# Patient Record
Sex: FEMALE | Race: BLACK OR AFRICAN AMERICAN
[De-identification: names, ages, dates, MRNs, and addresses within clinical notes are randomized per-mention and may not be internally consistent; named-entity substitution may affect disease eponyms.]

---

## 2017-05-01 ENCOUNTER — HOSPITAL ENCOUNTER (INPATIENT)
Dept: HOSPITAL 1 - ED | Age: 37
LOS: 2 days | Discharge: HOME | DRG: 917 | End: 2017-05-03
Attending: FAMILY MEDICINE | Admitting: FAMILY MEDICINE
Payer: COMMERCIAL

## 2017-05-01 VITALS — SYSTOLIC BLOOD PRESSURE: 110 MMHG | DIASTOLIC BLOOD PRESSURE: 87 MMHG

## 2017-05-01 VITALS — WEIGHT: 125.49 LBS | BODY MASS INDEX: 24.64 KG/M2 | HEIGHT: 60 IN

## 2017-05-01 VITALS — DIASTOLIC BLOOD PRESSURE: 76 MMHG | SYSTOLIC BLOOD PRESSURE: 113 MMHG

## 2017-05-01 VITALS — SYSTOLIC BLOOD PRESSURE: 108 MMHG | DIASTOLIC BLOOD PRESSURE: 64 MMHG

## 2017-05-01 VITALS — SYSTOLIC BLOOD PRESSURE: 123 MMHG | DIASTOLIC BLOOD PRESSURE: 88 MMHG

## 2017-05-01 DIAGNOSIS — H18.603: ICD-10-CM

## 2017-05-01 DIAGNOSIS — F33.1: ICD-10-CM

## 2017-05-01 DIAGNOSIS — J45.909: ICD-10-CM

## 2017-05-01 DIAGNOSIS — M41.9: ICD-10-CM

## 2017-05-01 DIAGNOSIS — H54.8: ICD-10-CM
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
## 2017-05-01 DIAGNOSIS — E83.42: ICD-10-CM

## 2017-05-01 DIAGNOSIS — Z91.19: ICD-10-CM

## 2017-05-01 DIAGNOSIS — M06.9: ICD-10-CM

## 2017-05-01 DIAGNOSIS — T42.8X1A: Primary | ICD-10-CM
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
## 2017-05-01 DIAGNOSIS — G92: ICD-10-CM

## 2017-05-01 LAB
ALBUMIN SERPL-MCNC: 3.7 G/DL (ref 3.4–5)
ALP SERPL-CCNC: 45 U/L (ref 46–116)
ALT SERPL-CCNC: 38 U/L (ref 14–59)
AMPHETAMINES UR QL SCN: (no result)
AMYLASE SERPL-CCNC: 96 U/L (ref 25–115)
AST SERPL-CCNC: 28 U/L (ref 15–37)
BASOPHILS NFR BLD: 0.1 % (ref 0–2)
BILIRUB SERPL-MCNC: 0.22 MG/DL (ref 0.2–1)
BUN SERPL-MCNC: 15 MG/DL (ref 7–18)
CALCIUM SERPL-MCNC: 8.3 MG/DL (ref 8.5–10.1)
CHLORIDE SERPL-SCNC: 107 MMOL/L (ref 98–107)
CHOLEST SERPL-MCNC: 133 MG/DL (ref ?–200)
CO2 SERPL-SCNC: 28.2 MMOL/L (ref 21–32)
CREAT SERPL-MCNC: 0.8 MG/DL (ref 0.6–1)
ERYTHROCYTE [DISTWIDTH] IN BLOOD BY AUTOMATED COUNT: 13.4 % (ref 11.5–14.5)
GFR SERPLBLD BASED ON 1.73 SQ M-ARVRAT: > 60 ML/MIN
GLUCOSE SERPL-MCNC: 76 MG/DL (ref 74–106)
HDLC SERPL-MCNC: 64 MG/DL (ref 40–60)
LIPASE SERPL-CCNC: 276 IU/L (ref 73–393)
MAGNESIUM SERPL-MCNC: 1.7 MG/DL (ref 1.8–2.4)
MICROSCOPIC UR-IMP: NO
PLATELET # BLD: 322 X10^3MCL (ref 130–400)
POTASSIUM SERPL-SCNC: 3.5 MMOL/L (ref 3.5–5.1)
PROT SERPL-MCNC: 6.8 G/DL (ref 6.4–8.2)
RBC # UR STRIP.AUTO: NEGATIVE /UL
SODIUM SERPL-SCNC: 142 MMOL/L (ref 136–145)
T4 SERPL-MCNC: 8.6 UG/DL (ref 4.7–13.3)
UA SPECIFIC GRAVITY: 1.02 (ref 1–1.03)

## 2017-05-01 PROCEDURE — G0480 DRUG TEST DEF 1-7 CLASSES: HCPCS

## 2017-05-02 VITALS — SYSTOLIC BLOOD PRESSURE: 121 MMHG | DIASTOLIC BLOOD PRESSURE: 88 MMHG

## 2017-05-02 VITALS — SYSTOLIC BLOOD PRESSURE: 122 MMHG | DIASTOLIC BLOOD PRESSURE: 94 MMHG

## 2017-05-02 VITALS — SYSTOLIC BLOOD PRESSURE: 107 MMHG | DIASTOLIC BLOOD PRESSURE: 89 MMHG

## 2017-05-02 VITALS — SYSTOLIC BLOOD PRESSURE: 143 MMHG | DIASTOLIC BLOOD PRESSURE: 71 MMHG

## 2017-05-02 LAB
BASOPHILS NFR BLD: 0.4 % (ref 0–2)
BUN SERPL-MCNC: 7 MG/DL (ref 7–18)
CALCIUM SERPL-MCNC: 7.7 MG/DL (ref 8.5–10.1)
CHLORIDE SERPL-SCNC: 111 MMOL/L (ref 98–107)
CO2 SERPL-SCNC: 26.1 MMOL/L (ref 21–32)
CREAT SERPL-MCNC: 0.6 MG/DL (ref 0.6–1)
ERYTHROCYTE [DISTWIDTH] IN BLOOD BY AUTOMATED COUNT: 13.5 % (ref 11.5–14.5)
GFR SERPLBLD BASED ON 1.73 SQ M-ARVRAT: > 60 ML/MIN
GLUCOSE SERPL-MCNC: 85 MG/DL (ref 74–106)
MAGNESIUM SERPL-MCNC: 2 MG/DL (ref 1.8–2.4)
PHOSPHATE SERPL-MCNC: 3.5 MG/DL (ref 2.5–4.9)
PLATELET # BLD: 262 X10^3MCL (ref 130–400)
POTASSIUM SERPL-SCNC: 3.9 MMOL/L (ref 3.5–5.1)
SODIUM SERPL-SCNC: 144 MMOL/L (ref 136–145)

## 2017-05-03 VITALS — SYSTOLIC BLOOD PRESSURE: 126 MMHG | DIASTOLIC BLOOD PRESSURE: 92 MMHG

## 2017-05-03 VITALS — SYSTOLIC BLOOD PRESSURE: 133 MMHG | DIASTOLIC BLOOD PRESSURE: 87 MMHG

## 2017-05-03 VITALS — DIASTOLIC BLOOD PRESSURE: 92 MMHG | SYSTOLIC BLOOD PRESSURE: 126 MMHG

## 2017-05-03 LAB
BASOPHILS NFR BLD: 0.4 % (ref 0–2)
BUN SERPL-MCNC: 7 MG/DL (ref 7–18)
CALCIUM SERPL-MCNC: 8.2 MG/DL (ref 8.5–10.1)
CHLORIDE SERPL-SCNC: 109 MMOL/L (ref 98–107)
CO2 SERPL-SCNC: 26.5 MMOL/L (ref 21–32)
CREAT SERPL-MCNC: 0.6 MG/DL (ref 0.6–1)
ERYTHROCYTE [DISTWIDTH] IN BLOOD BY AUTOMATED COUNT: 13.3 % (ref 11.5–14.5)
GFR SERPLBLD BASED ON 1.73 SQ M-ARVRAT: > 60 ML/MIN
GLUCOSE SERPL-MCNC: 82 MG/DL (ref 74–106)
PLATELET # BLD: 283 X10^3MCL (ref 130–400)
POTASSIUM SERPL-SCNC: 3.7 MMOL/L (ref 3.5–5.1)
SODIUM SERPL-SCNC: 143 MMOL/L (ref 136–145)

## 2018-02-27 ENCOUNTER — HOSPITAL ENCOUNTER (EMERGENCY)
Dept: HOSPITAL 1 - ED | Age: 38
Discharge: HOME | End: 2018-02-27
Payer: COMMERCIAL

## 2018-02-27 VITALS — SYSTOLIC BLOOD PRESSURE: 120 MMHG | DIASTOLIC BLOOD PRESSURE: 73 MMHG

## 2018-02-27 VITALS — BODY MASS INDEX: 26.61 KG/M2 | HEIGHT: 59.02 IN | WEIGHT: 131.99 LBS

## 2018-02-27 DIAGNOSIS — B34.9: Primary | ICD-10-CM

## 2018-03-04 ENCOUNTER — HOSPITAL ENCOUNTER (EMERGENCY)
Dept: HOSPITAL 1 - ED | Age: 38
Discharge: HOME | End: 2018-03-04
Payer: COMMERCIAL

## 2018-03-04 VITALS — HEIGHT: 59.02 IN | WEIGHT: 134 LBS | BODY MASS INDEX: 27.01 KG/M2

## 2018-03-04 VITALS — SYSTOLIC BLOOD PRESSURE: 132 MMHG | DIASTOLIC BLOOD PRESSURE: 60 MMHG

## 2018-03-04 DIAGNOSIS — Z88.1: ICD-10-CM

## 2018-03-04 DIAGNOSIS — M54.5: Primary | ICD-10-CM

## 2018-03-04 DIAGNOSIS — Z88.6: ICD-10-CM

## 2018-04-27 ENCOUNTER — HOSPITAL ENCOUNTER (EMERGENCY)
Dept: HOSPITAL 1 - ED | Age: 38
Discharge: HOME | End: 2018-04-27
Payer: COMMERCIAL

## 2018-04-27 VITALS — BODY MASS INDEX: 25.2 KG/M2 | WEIGHT: 124.98 LBS | HEIGHT: 59.02 IN

## 2018-04-27 VITALS — SYSTOLIC BLOOD PRESSURE: 121 MMHG | DIASTOLIC BLOOD PRESSURE: 86 MMHG

## 2018-04-27 DIAGNOSIS — Z88.5: ICD-10-CM

## 2018-04-27 DIAGNOSIS — Z88.8: ICD-10-CM

## 2018-04-27 DIAGNOSIS — J20.8: Primary | ICD-10-CM

## 2018-04-27 DIAGNOSIS — J45.909: ICD-10-CM

## 2018-08-21 ENCOUNTER — HOSPITAL ENCOUNTER (EMERGENCY)
Dept: HOSPITAL 1 - ED | Age: 38
Discharge: HOME | End: 2018-08-21
Payer: COMMERCIAL

## 2018-08-21 VITALS — DIASTOLIC BLOOD PRESSURE: 84 MMHG | SYSTOLIC BLOOD PRESSURE: 122 MMHG

## 2018-08-21 VITALS — WEIGHT: 116 LBS | HEIGHT: 59 IN | BODY MASS INDEX: 23.39 KG/M2

## 2018-08-21 DIAGNOSIS — M25.551: ICD-10-CM

## 2018-08-21 DIAGNOSIS — Z88.6: ICD-10-CM

## 2018-08-21 DIAGNOSIS — J45.909: ICD-10-CM

## 2018-08-21 DIAGNOSIS — Z88.1: ICD-10-CM

## 2018-08-21 DIAGNOSIS — M54.5: Primary | ICD-10-CM

## 2018-10-15 ENCOUNTER — HOSPITAL ENCOUNTER (EMERGENCY)
Dept: HOSPITAL 1 - ED | Age: 38
Discharge: HOME | End: 2018-10-15
Payer: COMMERCIAL

## 2018-10-15 VITALS — BODY MASS INDEX: 23.18 KG/M2 | WEIGHT: 115 LBS | HEIGHT: 59 IN

## 2018-10-15 VITALS — DIASTOLIC BLOOD PRESSURE: 95 MMHG | SYSTOLIC BLOOD PRESSURE: 121 MMHG

## 2018-10-15 DIAGNOSIS — M41.9: Primary | ICD-10-CM

## 2018-10-15 DIAGNOSIS — Z88.6: ICD-10-CM

## 2018-10-15 DIAGNOSIS — Z88.1: ICD-10-CM

## 2018-10-15 DIAGNOSIS — M25.512: ICD-10-CM

## 2018-10-15 DIAGNOSIS — Z90.710: ICD-10-CM

## 2018-10-15 DIAGNOSIS — J45.909: ICD-10-CM

## 2018-10-30 ENCOUNTER — HOSPITAL ENCOUNTER (EMERGENCY)
Dept: HOSPITAL 1 - ED | Age: 38
Discharge: HOME | End: 2018-10-30
Payer: COMMERCIAL

## 2018-10-30 VITALS
BODY MASS INDEX: 21.77 KG/M2 | WEIGHT: 108 LBS | BODY MASS INDEX: 21.77 KG/M2 | HEIGHT: 59 IN | WEIGHT: 108 LBS | HEIGHT: 59 IN

## 2018-10-30 VITALS — SYSTOLIC BLOOD PRESSURE: 124 MMHG | DIASTOLIC BLOOD PRESSURE: 106 MMHG

## 2018-10-30 DIAGNOSIS — Z88.1: ICD-10-CM

## 2018-10-30 DIAGNOSIS — M54.5: Primary | ICD-10-CM

## 2018-10-30 DIAGNOSIS — Z88.6: ICD-10-CM

## 2018-10-30 DIAGNOSIS — Z94.7: ICD-10-CM

## 2018-10-30 DIAGNOSIS — M41.9: ICD-10-CM

## 2018-10-30 DIAGNOSIS — J45.909: ICD-10-CM

## 2018-10-30 DIAGNOSIS — Z90.711: ICD-10-CM

## 2018-11-27 ENCOUNTER — HOSPITAL ENCOUNTER (INPATIENT)
Dept: HOSPITAL 1 - ED | Age: 38
LOS: 2 days | Discharge: HOME | DRG: 917 | End: 2018-11-29
Attending: FAMILY MEDICINE | Admitting: FAMILY MEDICINE
Payer: COMMERCIAL

## 2018-11-27 VITALS
BODY MASS INDEX: 22.87 KG/M2 | WEIGHT: 113.44 LBS | WEIGHT: 113.44 LBS | HEIGHT: 59 IN | BODY MASS INDEX: 22.87 KG/M2 | HEIGHT: 59 IN

## 2018-11-27 VITALS — DIASTOLIC BLOOD PRESSURE: 103 MMHG | SYSTOLIC BLOOD PRESSURE: 139 MMHG

## 2018-11-27 DIAGNOSIS — E87.6: ICD-10-CM

## 2018-11-27 DIAGNOSIS — J45.909: ICD-10-CM
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
## 2018-11-27 DIAGNOSIS — E83.42: ICD-10-CM

## 2018-11-27 DIAGNOSIS — F16.10: ICD-10-CM

## 2018-11-27 DIAGNOSIS — E83.39: ICD-10-CM

## 2018-11-27 DIAGNOSIS — N17.0: ICD-10-CM

## 2018-11-27 DIAGNOSIS — M06.9: ICD-10-CM

## 2018-11-27 DIAGNOSIS — Z87.891: ICD-10-CM

## 2018-11-27 DIAGNOSIS — F11.10: ICD-10-CM

## 2018-11-27 DIAGNOSIS — M41.9: ICD-10-CM

## 2018-11-27 DIAGNOSIS — R10.31: ICD-10-CM

## 2018-11-27 DIAGNOSIS — D53.9: ICD-10-CM

## 2018-11-27 DIAGNOSIS — R00.0: ICD-10-CM

## 2018-11-27 DIAGNOSIS — T43.621A: Primary | ICD-10-CM

## 2018-11-27 LAB
ALBUMIN SERPL-MCNC: 3.4 G/DL (ref 3.4–5)
ALP SERPL-CCNC: 44 U/L (ref 46–116)
ALT SERPL-CCNC: 15 U/L (ref 14–59)
AMPHETAMINES UR QL SCN: POSITIVE
AMYLASE SERPL-CCNC: 40 U/L (ref 25–115)
AST SERPL-CCNC: 22 U/L (ref 15–37)
BASOPHILS NFR BLD: 0.2 % (ref 0–2)
BILIRUB SERPL-MCNC: 0.2 MG/DL (ref 0.2–1)
BUN SERPL-MCNC: 19 MG/DL (ref 7–18)
CALCIUM SERPL-MCNC: 7.7 MG/DL (ref 8.5–10.1)
CHLORIDE SERPL-SCNC: 108 MMOL/L (ref 98–107)
CHOLEST SERPL-MCNC: 119 MG/DL (ref ?–200)
CHOLEST/HDLC SERPL: 2.8 MG/DL
CO2 SERPL-SCNC: 18.3 MMOL/L (ref 21–32)
CREAT SERPL-MCNC: 1 MG/DL (ref 0.6–1)
ERYTHROCYTE [DISTWIDTH] IN BLOOD BY AUTOMATED COUNT: 13.1 % (ref 11.5–14.5)
GFR SERPLBLD BASED ON 1.73 SQ M-ARVRAT: > 60 ML/MIN
GLUCOSE SERPL-MCNC: 183 MG/DL (ref 74–106)
HDLC SERPL-MCNC: 42 MG/DL (ref 40–60)
LIPASE SERPL-CCNC: 143 IU/L (ref 73–393)
MAGNESIUM SERPL-MCNC: 1.6 MG/DL (ref 1.8–2.4)
PHOSPHATE SERPL-MCNC: 1 MG/DL (ref 2.5–4.9)
PLATELET # BLD: 314 X10^3MCL (ref 130–400)
POTASSIUM SERPL-SCNC: 3 MMOL/L (ref 3.5–5.1)
PROT SERPL-MCNC: 6.6 G/DL (ref 6.4–8.2)
SODIUM SERPL-SCNC: 139 MMOL/L (ref 136–145)
T3 SERPL-MCNC: 1.33 NG/ML
T3RU NFR SERPL: 38 % UPTAKE (ref 30–39)
T4 FREE SERPL-MCNC: 1.51 NG/DL (ref 0.76–1.46)
T4 SERPL-MCNC: 9.2 UG/DL (ref 4.7–13.3)
T4/T3 UPTAKE INDEX SERPL: 3.5 UG/DL (ref 1.4–4.5)
TRIGL SERPL-MCNC: 34 MG/DL (ref ?–150)

## 2018-11-27 PROCEDURE — G0480 DRUG TEST DEF 1-7 CLASSES: HCPCS

## 2018-11-28 VITALS — SYSTOLIC BLOOD PRESSURE: 139 MMHG | DIASTOLIC BLOOD PRESSURE: 103 MMHG

## 2018-11-28 VITALS — SYSTOLIC BLOOD PRESSURE: 96 MMHG | DIASTOLIC BLOOD PRESSURE: 69 MMHG

## 2018-11-28 VITALS — SYSTOLIC BLOOD PRESSURE: 108 MMHG | DIASTOLIC BLOOD PRESSURE: 65 MMHG

## 2018-11-28 VITALS — SYSTOLIC BLOOD PRESSURE: 113 MMHG | DIASTOLIC BLOOD PRESSURE: 77 MMHG

## 2018-11-28 VITALS — DIASTOLIC BLOOD PRESSURE: 66 MMHG | SYSTOLIC BLOOD PRESSURE: 105 MMHG

## 2018-11-28 VITALS — SYSTOLIC BLOOD PRESSURE: 101 MMHG | DIASTOLIC BLOOD PRESSURE: 69 MMHG

## 2018-11-28 VITALS — SYSTOLIC BLOOD PRESSURE: 106 MMHG | DIASTOLIC BLOOD PRESSURE: 77 MMHG

## 2018-11-28 LAB
BASOPHILS NFR BLD: 0.3 % (ref 0–2)
BUN SERPL-MCNC: 9 MG/DL (ref 7–18)
CALCIUM SERPL-MCNC: 8.7 MG/DL (ref 8.5–10.1)
CHLORIDE SERPL-SCNC: 108 MMOL/L (ref 98–107)
CO2 SERPL-SCNC: 23.5 MMOL/L (ref 21–32)
CREAT SERPL-MCNC: 0.7 MG/DL (ref 0.6–1)
ERYTHROCYTE [DISTWIDTH] IN BLOOD BY AUTOMATED COUNT: 12.9 % (ref 11.5–14.5)
GFR SERPLBLD BASED ON 1.73 SQ M-ARVRAT: > 60 ML/MIN
GLUCOSE SERPL-MCNC: 80 MG/DL (ref 74–106)
MICROSCOPIC UR-IMP: YES
PLATELET # BLD: 294 X10^3MCL (ref 130–400)
POTASSIUM SERPL-SCNC: 3.6 MMOL/L (ref 3.5–5.1)
RBC # UR STRIP.AUTO: NEGATIVE /UL
SODIUM SERPL-SCNC: 141 MMOL/L (ref 136–145)
UA SPECIFIC GRAVITY: 1.03 (ref 1–1.03)

## 2018-11-29 VITALS — DIASTOLIC BLOOD PRESSURE: 77 MMHG | SYSTOLIC BLOOD PRESSURE: 117 MMHG

## 2018-11-29 VITALS — DIASTOLIC BLOOD PRESSURE: 82 MMHG | SYSTOLIC BLOOD PRESSURE: 120 MMHG

## 2018-11-29 VITALS — DIASTOLIC BLOOD PRESSURE: 89 MMHG | SYSTOLIC BLOOD PRESSURE: 129 MMHG

## 2019-02-01 ENCOUNTER — HOSPITAL ENCOUNTER (EMERGENCY)
Dept: HOSPITAL 1 - ED | Age: 39
Discharge: HOME | End: 2019-02-01
Payer: COMMERCIAL

## 2019-02-01 VITALS — HEIGHT: 59 IN | BODY MASS INDEX: 23.59 KG/M2 | WEIGHT: 117 LBS

## 2019-02-01 VITALS — SYSTOLIC BLOOD PRESSURE: 128 MMHG | DIASTOLIC BLOOD PRESSURE: 83 MMHG

## 2019-02-01 DIAGNOSIS — Z90.711: ICD-10-CM

## 2019-02-01 DIAGNOSIS — E87.6: Primary | ICD-10-CM

## 2019-02-01 DIAGNOSIS — R53.1: ICD-10-CM

## 2019-02-01 DIAGNOSIS — J45.909: ICD-10-CM

## 2019-02-01 DIAGNOSIS — Z88.6: ICD-10-CM

## 2019-02-01 DIAGNOSIS — R42: ICD-10-CM

## 2019-02-01 DIAGNOSIS — Z88.1: ICD-10-CM

## 2019-02-01 LAB
ALBUMIN SERPL-MCNC: 4.2 G/DL (ref 3.4–5)
ALP SERPL-CCNC: 51 U/L (ref 46–116)
ALT SERPL-CCNC: 35 U/L (ref 14–59)
AMPHETAMINES UR QL SCN: (no result)
AST SERPL-CCNC: 36 U/L (ref 15–37)
BASOPHILS NFR BLD: 0.4 % (ref 0–2)
BILIRUB SERPL-MCNC: 0.3 MG/DL (ref 0.2–1)
BUN SERPL-MCNC: 16 MG/DL (ref 7–18)
CALCIUM SERPL-MCNC: 9.2 MG/DL (ref 8.5–10.1)
CHLORIDE SERPL-SCNC: 101 MMOL/L (ref 98–107)
CO2 SERPL-SCNC: 23.6 MMOL/L (ref 21–32)
CREAT SERPL-MCNC: 0.9 MG/DL (ref 0.6–1)
ERYTHROCYTE [DISTWIDTH] IN BLOOD BY AUTOMATED COUNT: 11.6 % (ref 11.5–14.5)
GFR SERPLBLD BASED ON 1.73 SQ M-ARVRAT: > 60 ML/MIN
GLUCOSE SERPL-MCNC: 107 MG/DL (ref 74–106)
PLATELET # BLD: 331 X10^3MCL (ref 130–400)
POTASSIUM SERPL-SCNC: 3.2 MMOL/L (ref 3.5–5.1)
PROT SERPL-MCNC: 8.3 G/DL (ref 6.4–8.2)
SODIUM SERPL-SCNC: 136 MMOL/L (ref 136–145)
T3 SERPL-MCNC: 1.81 NG/ML
T3RU NFR SERPL: 37 % UPTAKE (ref 30–39)
T4 FREE SERPL-MCNC: 1.17 NG/DL (ref 0.76–1.46)
T4 SERPL-MCNC: 11.7 UG/DL (ref 4.7–13.3)
T4/T3 UPTAKE INDEX SERPL: 4.3 UG/DL (ref 1.4–4.5)

## 2019-02-23 ENCOUNTER — HOSPITAL ENCOUNTER (EMERGENCY)
Dept: HOSPITAL 1 - ED | Age: 39
Discharge: HOME | End: 2019-02-23
Payer: COMMERCIAL

## 2019-02-23 VITALS — DIASTOLIC BLOOD PRESSURE: 78 MMHG | SYSTOLIC BLOOD PRESSURE: 102 MMHG

## 2019-02-23 VITALS — WEIGHT: 112 LBS | HEIGHT: 59 IN | BODY MASS INDEX: 22.58 KG/M2

## 2019-02-23 DIAGNOSIS — I10: ICD-10-CM
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
## 2019-02-23 DIAGNOSIS — M54.5: Primary | ICD-10-CM

## 2019-02-23 DIAGNOSIS — H18.609: ICD-10-CM

## 2019-02-23 LAB
MICROSCOPIC UR-IMP: NO
RBC # UR STRIP.AUTO: NEGATIVE /UL
UA SPECIFIC GRAVITY: >=1.03 (ref 1–1.03)

## 2019-12-21 ENCOUNTER — HOSPITAL ENCOUNTER (EMERGENCY)
Dept: HOSPITAL 26 - MED | Age: 39
Discharge: HOME | End: 2019-12-21
Payer: COMMERCIAL

## 2019-12-21 VITALS — DIASTOLIC BLOOD PRESSURE: 90 MMHG | SYSTOLIC BLOOD PRESSURE: 138 MMHG

## 2019-12-21 VITALS — HEIGHT: 60 IN | BODY MASS INDEX: 23.16 KG/M2 | WEIGHT: 118 LBS

## 2019-12-21 VITALS — SYSTOLIC BLOOD PRESSURE: 138 MMHG | DIASTOLIC BLOOD PRESSURE: 90 MMHG

## 2019-12-21 DIAGNOSIS — J45.909: ICD-10-CM

## 2019-12-21 DIAGNOSIS — Z85.89: ICD-10-CM

## 2019-12-21 DIAGNOSIS — M79.652: Primary | ICD-10-CM

## 2019-12-21 DIAGNOSIS — Z88.8: ICD-10-CM

## 2019-12-21 DIAGNOSIS — Z90.710: ICD-10-CM

## 2019-12-21 DIAGNOSIS — Z88.1: ICD-10-CM

## 2019-12-21 PROCEDURE — 99283 EMERGENCY DEPT VISIT LOW MDM: CPT

## 2019-12-21 PROCEDURE — 96372 THER/PROPH/DIAG INJ SC/IM: CPT

## 2019-12-21 PROCEDURE — 81002 URINALYSIS NONAUTO W/O SCOPE: CPT

## 2019-12-21 NOTE — NUR
REPORTS PAIN RELIEF; 6/10. PT STATES PAIN LEVEL IS TOLERABLE AT THIS TIME. PT 
STATES SHE WILL BE CALLING MOM FOR RIDE HOME.

## 2019-12-21 NOTE — NUR
Patient discharged with v/s stable. Written and verbal after care instructions 
given and explained. 

Patient alert, oriented and verbalized understanding of instructions. 
Ambulatory with steady gait. All questions addressed prior to discharge. ID 
band removed. Patient advised to follow up with PMD. Rx of Norco given. Patient 
educated on indication of medication including possible reaction and side 
effects. Opportunity to ask questions provided and answered.

## 2019-12-21 NOTE — NUR
40 YO F NURIA SELF PRESENTS TO ED C/O 10/10 THROBBING UPPER LEFT LEG PAIN X 3 
DAYS THAT IS CONSTANT. PT HAS HAD SIMILAR PAIN BEFORE X 1 YEAR AGO. NEGATIVE 
XRAY, TX'D WITH PHYSICAL THERAPY. PT STATES PHYSICAL THERAPY MADE IT WORSE BUT 
GRADUALLY WENT AWAY. PT STATES PAIN HAS BEEN GRADUALLY RETURNING X 1 WEEK. PAIN 
HAS BEEN UNCONTROLLED AND SEVERE X 3 DAYS. MEDICATED AT HOME WITH TRAMADOL AND 
ROBAXIN WITH MINIMAL RELIEF. REPORTS DIFFICULTY WALKING.



PMH-- HIV+, HYSTERECTOMY- 2014, HERNIA REPAIR-2015

RX-- TRIMEC, TRAMADOL AND ROBAXIN 

-------------------------------------------------------------------------------

Addendum: 12/21/19 at 0612 by [facility]

-------------------------------------------------------------------------------

DENIES RECENT TRAUMA/INJURY.

## 2019-12-29 ENCOUNTER — HOSPITAL ENCOUNTER (EMERGENCY)
Dept: HOSPITAL 26 - MED | Age: 39
Discharge: HOME | End: 2019-12-29
Payer: COMMERCIAL

## 2019-12-29 VITALS — BODY MASS INDEX: 23.79 KG/M2 | HEIGHT: 59 IN | WEIGHT: 118 LBS

## 2019-12-29 VITALS — DIASTOLIC BLOOD PRESSURE: 78 MMHG | SYSTOLIC BLOOD PRESSURE: 121 MMHG

## 2019-12-29 VITALS — DIASTOLIC BLOOD PRESSURE: 79 MMHG | SYSTOLIC BLOOD PRESSURE: 127 MMHG

## 2019-12-29 DIAGNOSIS — Z98.890: ICD-10-CM

## 2019-12-29 DIAGNOSIS — Z79.899: ICD-10-CM

## 2019-12-29 DIAGNOSIS — Z88.8: ICD-10-CM

## 2019-12-29 DIAGNOSIS — J45.909: ICD-10-CM

## 2019-12-29 DIAGNOSIS — Z88.1: ICD-10-CM

## 2019-12-29 DIAGNOSIS — Z76.0: ICD-10-CM

## 2019-12-29 DIAGNOSIS — M79.605: Primary | ICD-10-CM

## 2019-12-29 DIAGNOSIS — Z90.710: ICD-10-CM

## 2019-12-29 PROCEDURE — 99283 EMERGENCY DEPT VISIT LOW MDM: CPT

## 2019-12-29 PROCEDURE — 96372 THER/PROPH/DIAG INJ SC/IM: CPT

## 2019-12-29 NOTE — NUR
Patient discharged with v/s stable. Written and verbal after care instructions 
given and explained. 

Patient alert, oriented and verbalized understanding of instructions. 
Ambulatory with steady gait. All questions addressed prior to discharge. ID 
band removed. Patient advised to follow up with PMD. Rx of VOLTAREN given. 
Patient educated on indication of medication including possible reaction and 
side effects. Opportunity to ask questions provided and answered.

## 2019-12-29 NOTE — NUR
C/O L LEG PAIN 8/10 & THROBBING X2 WEEKS. PT STATES THE PAIN STARTS IN THE HIP 
AREA AND RADIATES DOWN THE LLE. MEDICATED AT HOME WITH TRAMADOL WITH NO RELIEF. 
PT STATES IT IS PAINFUL AND DIFFICULT TO WALK. NO OBVIOUS DEFORMITY TO LLE, PT 
DENIES INJURY. CMS INTACT. PT PROVIDED GOWN TO CHANGE INTO, SIDE RAIL UP X1, 
BED IN LOW POSITION.

## 2020-01-27 ENCOUNTER — HOSPITAL ENCOUNTER (EMERGENCY)
Dept: HOSPITAL 26 - MED | Age: 40
Discharge: HOME | End: 2020-01-27
Payer: COMMERCIAL

## 2020-01-27 VITALS — HEIGHT: 60 IN | BODY MASS INDEX: 23.16 KG/M2 | WEIGHT: 118 LBS

## 2020-01-27 VITALS — SYSTOLIC BLOOD PRESSURE: 113 MMHG | DIASTOLIC BLOOD PRESSURE: 93 MMHG

## 2020-01-27 VITALS — DIASTOLIC BLOOD PRESSURE: 89 MMHG | SYSTOLIC BLOOD PRESSURE: 121 MMHG

## 2020-01-27 DIAGNOSIS — M25.552: ICD-10-CM

## 2020-01-27 DIAGNOSIS — Z76.0: ICD-10-CM

## 2020-01-27 DIAGNOSIS — J45.909: ICD-10-CM

## 2020-01-27 DIAGNOSIS — Z85.89: ICD-10-CM

## 2020-01-27 DIAGNOSIS — G89.29: Primary | ICD-10-CM

## 2020-01-27 DIAGNOSIS — Z79.899: ICD-10-CM

## 2020-01-27 DIAGNOSIS — Z88.1: ICD-10-CM

## 2020-03-15 ENCOUNTER — HOSPITAL ENCOUNTER (EMERGENCY)
Dept: HOSPITAL 26 - MED | Age: 40
Discharge: HOME | End: 2020-03-15
Payer: COMMERCIAL

## 2020-03-15 VITALS — DIASTOLIC BLOOD PRESSURE: 69 MMHG | SYSTOLIC BLOOD PRESSURE: 112 MMHG

## 2020-03-15 VITALS — SYSTOLIC BLOOD PRESSURE: 112 MMHG | DIASTOLIC BLOOD PRESSURE: 69 MMHG

## 2020-03-15 VITALS — WEIGHT: 115 LBS | BODY MASS INDEX: 22.58 KG/M2 | HEIGHT: 60 IN

## 2020-03-15 DIAGNOSIS — Z79.899: ICD-10-CM

## 2020-03-15 DIAGNOSIS — J45.909: ICD-10-CM

## 2020-03-15 DIAGNOSIS — I10: ICD-10-CM

## 2020-03-15 DIAGNOSIS — M25.552: Primary | ICD-10-CM

## 2020-03-15 DIAGNOSIS — Z88.1: ICD-10-CM

## 2020-03-15 DIAGNOSIS — Z88.6: ICD-10-CM

## 2020-03-15 DIAGNOSIS — Z85.3: ICD-10-CM

## 2020-03-15 DIAGNOSIS — M79.605: ICD-10-CM

## 2020-03-15 PROCEDURE — 99284 EMERGENCY DEPT VISIT MOD MDM: CPT

## 2020-03-15 PROCEDURE — 96372 THER/PROPH/DIAG INJ SC/IM: CPT

## 2020-03-15 NOTE — NUR
40 Y/O FEMALE C/O LT HIP PAIN X 1 YEAR WORSENING THE LAST 2 DAYS. STATES SHE IS 
AWAITING HIP REPLACEMENT AND PAIN MEDICATIONS ARE NOT CONTROLLING PAIN. STATES 
SHE IS UNABLE TO AMBULATE, DENIES TRAUMA/INJURY. 10/10 CONSTANT THROBBING PAIN. 
+ CMS. SKIN WARM AND DRY. NO DEFORMITIES NOTED. NO BRUISING/SWELLING. SITTING 
IN BED X 1 SIDE RAIL RAISED, BED LOCKED AND IN LOW POSITION. 



MEDHX: HIV, HTN

ALLERGIES: ROCEPHIN, IBUPROFEN

## 2020-03-15 NOTE — NUR
Patient discharged with v/s stable. Written and verbal after care instructions 
given and explained. 

Patient alert, oriented and verbalized understanding of instructions. 
Ambulatory with steady gait. All questions addressed prior to discharge. ID 
band removed. Patient advised to follow up with PMD. Rx of NORCO given. Patient 
educated on indication of medication including possible reaction and side 
effects. Opportunity to ask questions provided and answered.

PT STATES SHE HAS RIDE HOME

## 2020-09-08 ENCOUNTER — HOSPITAL ENCOUNTER (EMERGENCY)
Dept: HOSPITAL 26 - MED | Age: 40
Discharge: LEFT BEFORE DISCHARGE | End: 2020-09-08
Payer: COMMERCIAL

## 2020-09-08 VITALS — WEIGHT: 107.25 LBS | BODY MASS INDEX: 21.62 KG/M2 | HEIGHT: 59 IN

## 2020-09-08 VITALS — SYSTOLIC BLOOD PRESSURE: 132 MMHG | DIASTOLIC BLOOD PRESSURE: 92 MMHG

## 2020-09-08 DIAGNOSIS — Z79.899: ICD-10-CM

## 2020-09-08 DIAGNOSIS — J45.909: ICD-10-CM

## 2020-09-08 DIAGNOSIS — Z88.1: ICD-10-CM

## 2020-09-08 DIAGNOSIS — G89.29: ICD-10-CM

## 2020-09-08 DIAGNOSIS — Z85.41: ICD-10-CM

## 2020-09-08 DIAGNOSIS — I10: ICD-10-CM

## 2020-09-08 DIAGNOSIS — Z88.8: ICD-10-CM

## 2020-09-08 DIAGNOSIS — M25.552: Primary | ICD-10-CM

## 2020-09-08 PROCEDURE — 99283 EMERGENCY DEPT VISIT LOW MDM: CPT

## 2020-09-08 PROCEDURE — 96372 THER/PROPH/DIAG INJ SC/IM: CPT

## 2020-09-08 NOTE — NUR
41 YO FEMALE CO LEFT HIP PAIN SINCE YESTERDAY. PT STATES THAT SHE STEPPED OUT 
OF THE SHOWER WRONG AND HURT HER HIP. PT IS SEEING A SPECIALIST TO HAVE A HIP 
REPLACEMENT. PAIN IS 10/10 AND ACHING. PT IS ABLE TO AMBULATE. 



PMH- HTN, HIV, CERVICAL CANCER, ASTHMA

MEDS- NORCO Problem: Patient Care Overview  Goal: Plan of Care Review  Outcome: Ongoing (interventions implemented as appropriate)   03/15/18 0908   Coping/Psychosocial   Plan of Care Reviewed With patient   OTHER   Outcome Summary Pt increased ambulation distance. Heel strike better bilaterally w/ increased distance. More impulsive today w/ bed mobility and transfers.

## 2021-08-01 ENCOUNTER — HOSPITAL ENCOUNTER (EMERGENCY)
Dept: HOSPITAL 26 - MED | Age: 41
LOS: 1 days | Discharge: HOME | End: 2021-08-02
Payer: COMMERCIAL

## 2021-08-01 VITALS — DIASTOLIC BLOOD PRESSURE: 106 MMHG | SYSTOLIC BLOOD PRESSURE: 148 MMHG

## 2021-08-01 VITALS — WEIGHT: 110 LBS | HEIGHT: 59 IN | BODY MASS INDEX: 22.18 KG/M2

## 2021-08-01 DIAGNOSIS — J45.909: ICD-10-CM

## 2021-08-01 DIAGNOSIS — R00.0: ICD-10-CM

## 2021-08-01 DIAGNOSIS — F17.210: ICD-10-CM

## 2021-08-01 DIAGNOSIS — R55: Primary | ICD-10-CM

## 2021-08-01 DIAGNOSIS — Z88.1: ICD-10-CM

## 2021-08-01 DIAGNOSIS — I10: ICD-10-CM

## 2021-08-01 PROCEDURE — 99284 EMERGENCY DEPT VISIT MOD MDM: CPT

## 2021-08-01 PROCEDURE — 96360 HYDRATION IV INFUSION INIT: CPT

## 2021-08-01 PROCEDURE — 36415 COLL VENOUS BLD VENIPUNCTURE: CPT

## 2021-08-01 PROCEDURE — 80048 BASIC METABOLIC PNL TOTAL CA: CPT

## 2021-08-01 PROCEDURE — 85025 COMPLETE CBC W/AUTO DIFF WBC: CPT

## 2021-08-01 PROCEDURE — 93005 ELECTROCARDIOGRAM TRACING: CPT

## 2021-08-01 NOTE — NUR
BIBA FROM HOME, SYNCOPAL EPISODE . LOC FOR 30 SECS. IS NOW AWAKE AND ALERT. 
SKIN WARM AND DRY. AMBULATES AT BEDIDE WITH STEARY GAIT



MEDHX- DENIES

ALLX- ROCEPHIN

## 2021-08-02 VITALS — SYSTOLIC BLOOD PRESSURE: 128 MMHG | DIASTOLIC BLOOD PRESSURE: 84 MMHG

## 2021-08-02 LAB
ANION GAP SERPL CALCULATED.3IONS-SCNC: 13 MMOL/L (ref 8–16)
BASOPHILS # BLD AUTO: 0 K/UL (ref 0–0.22)
BASOPHILS NFR BLD AUTO: 0.3 % (ref 0–2)
BUN SERPL-MCNC: 16 MG/DL (ref 7–18)
CHLORIDE SERPL-SCNC: 102 MMOL/L (ref 98–107)
CO2 SERPL-SCNC: 27.1 MMOL/L (ref 21–32)
CREAT SERPL-MCNC: 0.9 MG/DL (ref 0.6–1.3)
EOSINOPHIL # BLD AUTO: 0.1 K/UL (ref 0–0.4)
EOSINOPHIL NFR BLD AUTO: 1.1 % (ref 0–4)
ERYTHROCYTE [DISTWIDTH] IN BLOOD BY AUTOMATED COUNT: 12.1 % (ref 11.6–13.7)
GFR SERPL CREATININE-BSD FRML MDRD: 89 ML/MIN (ref 90–?)
GLUCOSE SERPL-MCNC: 94 MG/DL (ref 74–106)
HCT VFR BLD AUTO: 37.7 % (ref 36–48)
HGB BLD-MCNC: 12.7 G/DL (ref 12–16)
LYMPHOCYTES # BLD AUTO: 1.2 K/UL (ref 2.5–16.5)
LYMPHOCYTES NFR BLD AUTO: 20 % (ref 20.5–51.1)
MCH RBC QN AUTO: 32 PG (ref 27–31)
MCHC RBC AUTO-ENTMCNC: 34 G/DL (ref 33–37)
MCV RBC AUTO: 96 FL (ref 80–94)
MONOCYTES # BLD AUTO: 0.8 K/UL (ref 0.8–1)
MONOCYTES NFR BLD AUTO: 12.6 % (ref 1.7–9.3)
NEUTROPHILS # BLD AUTO: 4 K/UL (ref 1.8–7.7)
NEUTROPHILS NFR BLD AUTO: 66 % (ref 42.2–75.2)
PLATELET # BLD AUTO: 367 K/UL (ref 140–450)
POTASSIUM SERPL-SCNC: 3.1 MMOL/L (ref 3.5–5.1)
RBC # BLD AUTO: 3.93 MIL/UL (ref 4.2–5.4)
SODIUM SERPL-SCNC: 139 MMOL/L (ref 136–145)
WBC # BLD AUTO: 6.1 K/UL (ref 4.8–10.8)

## 2021-08-17 ENCOUNTER — HOSPITAL ENCOUNTER (EMERGENCY)
Dept: HOSPITAL 26 - MED | Age: 41
Discharge: HOME | End: 2021-08-17
Payer: COMMERCIAL

## 2021-08-17 VITALS — SYSTOLIC BLOOD PRESSURE: 122 MMHG | DIASTOLIC BLOOD PRESSURE: 103 MMHG

## 2021-08-17 VITALS — SYSTOLIC BLOOD PRESSURE: 134 MMHG | DIASTOLIC BLOOD PRESSURE: 93 MMHG

## 2021-08-17 VITALS — DIASTOLIC BLOOD PRESSURE: 103 MMHG | SYSTOLIC BLOOD PRESSURE: 122 MMHG

## 2021-08-17 VITALS — HEIGHT: 59 IN

## 2021-08-17 DIAGNOSIS — J45.909: ICD-10-CM

## 2021-08-17 DIAGNOSIS — I10: ICD-10-CM

## 2021-08-17 DIAGNOSIS — R11.2: ICD-10-CM

## 2021-08-17 DIAGNOSIS — Z88.1: ICD-10-CM

## 2021-08-17 DIAGNOSIS — E86.0: ICD-10-CM

## 2021-08-17 DIAGNOSIS — R51.9: Primary | ICD-10-CM

## 2021-08-17 DIAGNOSIS — Z79.899: ICD-10-CM

## 2021-08-17 PROCEDURE — 96374 THER/PROPH/DIAG INJ IV PUSH: CPT

## 2021-08-17 PROCEDURE — 96375 TX/PRO/DX INJ NEW DRUG ADDON: CPT

## 2021-08-17 PROCEDURE — 99284 EMERGENCY DEPT VISIT MOD MDM: CPT

## 2021-08-17 NOTE — NUR
PT MOVED TO ER BED 8, IV 20GA RT HAND, IVF/IVP MEDS GIVEN-NADR AT THIS TIME. PT 
C/O H/A 8/10. ER MD NOTIFTED. ALL ORDERS EXECUTED

## 2021-08-17 NOTE — NUR
Patient discharged with v/s stable. Written and verbal after care instructions 
given and explained. 

Patient alert, oriented and verbalized understanding of instructions. 
Ambulatory with steady gait. All questions addressed prior to discharge. ID 
band removed. Patient advised to follow up with PMD. Rx of Ibuprofen, Zofran 
given. Patient educated on indication of medication including possible reaction 
and side effects. Opportunity to ask questions provided and answered.

## 2021-08-17 NOTE — NUR
Pupils equal and reactive to light bilaterally. No facial droop noted.  No 
smile deficit noted.  Speech normal for patient.  Patient is alert and oriented 
to person, place, time and event.  Bilateral hand  equal.  Bilateral foot 
push equal.

## 2021-08-17 NOTE — NUR
Patient states she feels better after Reglan and Benadryl IVP. Denies nausea at 
this time. All pt needs met.

## 2021-08-17 NOTE — NUR
40 y/o F BIB self from with c/c headache, chest pain, SOB x 4 days. Patient 
also reports nausea + vomiting 4 episodes today. Patient A&Ox4, ambulatory, 
states headache 10/10 to top and sides of head, throbbing/constant, 
non-radiating pain. Reports taking Naproxen, Tylenol, and tramadol with 
temporary relief. Patient denies fever, chills, abd pain, dysuria, urinary 
symptoms. Cardiac monitor in place. Bed locked in lowest position, side rails x 
1. 



PMH: HIV +

Meds: Denies

Allergies: Ceftriaxone

Sx: hysterectomy, L hip replacement, eye sx x 2

## 2022-02-11 ENCOUNTER — HOSPITAL ENCOUNTER (EMERGENCY)
Dept: HOSPITAL 26 - MED | Age: 42
Discharge: HOME | End: 2022-02-11
Payer: COMMERCIAL

## 2022-02-11 VITALS — BODY MASS INDEX: 22.98 KG/M2 | HEIGHT: 59 IN | WEIGHT: 114 LBS

## 2022-02-11 VITALS — DIASTOLIC BLOOD PRESSURE: 93 MMHG | SYSTOLIC BLOOD PRESSURE: 129 MMHG

## 2022-02-11 VITALS — DIASTOLIC BLOOD PRESSURE: 86 MMHG | SYSTOLIC BLOOD PRESSURE: 141 MMHG

## 2022-02-11 DIAGNOSIS — G89.29: ICD-10-CM

## 2022-02-11 DIAGNOSIS — Z79.899: ICD-10-CM

## 2022-02-11 DIAGNOSIS — M25.551: Primary | ICD-10-CM

## 2022-02-11 DIAGNOSIS — Z88.1: ICD-10-CM

## 2022-02-11 DIAGNOSIS — Z98.890: ICD-10-CM

## 2022-02-11 DIAGNOSIS — I10: ICD-10-CM

## 2022-02-11 DIAGNOSIS — Z85.41: ICD-10-CM

## 2022-02-11 DIAGNOSIS — J45.909: ICD-10-CM

## 2022-02-11 PROCEDURE — 96372 THER/PROPH/DIAG INJ SC/IM: CPT

## 2022-02-11 PROCEDURE — 99283 EMERGENCY DEPT VISIT LOW MDM: CPT

## 2022-02-11 NOTE — NUR
Patient discharged with v/s stable. Written and verbal after care instructions 
given and explained. 

Patient alert, oriented and verbalized understanding of instructions. 
Ambulatory with steady gait. All questions addressed prior to discharge. ID 
band removed. Patient advised to follow up with PMD. Rx of diclofenac sodium 
given. Patient educated on indication of medication including possible reaction 
and side effects. Opportunity to ask questions provided and answered.

## 2022-02-11 NOTE — NUR
40 y/o female ambulated to bed 4 with walker, pt states she "has a broken hip", 
pt states she stood up and something popped on her right side and radiates down 
to right ankle, also c/o nausea. 10/10 pain. denies vomiting, diarrhea, chills, 
sore throat, sob, cp and fevers. 



pmh: hiv+

allergy: ceftriaxone (sob, swelling)

med: tramadol, current hiv tx

## 2022-11-20 ENCOUNTER — HOSPITAL ENCOUNTER (EMERGENCY)
Dept: HOSPITAL 26 - MED | Age: 42
LOS: 1 days | Discharge: HOME | End: 2022-11-21
Payer: COMMERCIAL

## 2022-11-20 VITALS — HEIGHT: 59 IN | BODY MASS INDEX: 20.96 KG/M2 | WEIGHT: 104 LBS
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
## 2022-11-20 DIAGNOSIS — Z88.1: ICD-10-CM

## 2022-11-20 DIAGNOSIS — S73.101A: Primary | ICD-10-CM

## 2022-11-20 DIAGNOSIS — I10: ICD-10-CM

## 2022-11-20 DIAGNOSIS — Z85.41: ICD-10-CM

## 2022-11-20 DIAGNOSIS — Z98.890: ICD-10-CM

## 2022-11-20 DIAGNOSIS — Z79.899: ICD-10-CM
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
## 2022-11-20 DIAGNOSIS — J45.909: ICD-10-CM
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
## 2022-11-20 PROCEDURE — 73521 X-RAY EXAM HIPS BI 2 VIEWS: CPT

## 2022-11-20 PROCEDURE — 99283 EMERGENCY DEPT VISIT LOW MDM: CPT

## 2022-11-20 PROCEDURE — 96372 THER/PROPH/DIAG INJ SC/IM: CPT

## 2022-11-21 VITALS — DIASTOLIC BLOOD PRESSURE: 72 MMHG | SYSTOLIC BLOOD PRESSURE: 126 MMHG

## 2024-10-15 ENCOUNTER — HOSPITAL ENCOUNTER (EMERGENCY)
Dept: HOSPITAL 26 - MED | Age: 44
LOS: 1 days | Discharge: HOME | End: 2024-10-16
Payer: MEDICARE

## 2024-10-15 VITALS
TEMPERATURE: 98.1 F | WEIGHT: 115 LBS | RESPIRATION RATE: 19 BRPM | BODY MASS INDEX: 22.58 KG/M2 | OXYGEN SATURATION: 99 % | HEIGHT: 60 IN | SYSTOLIC BLOOD PRESSURE: 97 MMHG | DIASTOLIC BLOOD PRESSURE: 52 MMHG | HEART RATE: 77 BPM

## 2024-10-15 VITALS — OXYGEN SATURATION: 99 %
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
## 2024-10-15 DIAGNOSIS — F10.90: ICD-10-CM

## 2024-10-15 DIAGNOSIS — Z88.1: ICD-10-CM

## 2024-10-15 DIAGNOSIS — J45.909: ICD-10-CM

## 2024-10-15 DIAGNOSIS — Z85.41: ICD-10-CM

## 2024-10-15 DIAGNOSIS — F32.9: ICD-10-CM

## 2024-10-15 DIAGNOSIS — R56.9: ICD-10-CM

## 2024-10-15 DIAGNOSIS — I10: ICD-10-CM

## 2024-10-15 DIAGNOSIS — Z79.899: ICD-10-CM

## 2024-10-15 DIAGNOSIS — T50.994A: Primary | ICD-10-CM

## 2024-10-15 LAB
ALBUMIN FLD-MCNC: 2.5 G/DL (ref 3.4–5)
ALP SERPL-CCNC: 88 U/L (ref 50–136)
ALT SERPL-CCNC: 20 U/L (ref 12–78)
ANION GAP SERPL CALCULATED.3IONS-SCNC: 9.6 MMOL/L (ref 8–16)
APAP SERPL-MCNC: < 0.5 UG/ML (ref 10–30)
AST SERPL-CCNC: 33 U/L (ref 15–37)
BASOPHILS # BLD AUTO: 0 K/UL (ref 0–0.22)
BASOPHILS NFR BLD AUTO: 0.3 % (ref 0–2)
BILIRUB DIRECT SERPL-MCNC: 0 MG/DL (ref 0–0.3)
BILIRUB SERPL-MCNC: 0.1 MG/DL (ref 0–1)
BUN SERPL-MCNC: 11 MG/DL (ref 7–18)
CALCIUM SPEC-MCNC: 8.5 MG/DL (ref 8.5–10.1)
CHLORIDE SERPL-SCNC: 102 MMOL/L (ref 98–107)
CK SERPL-CCNC: 54 U/L (ref 26–192)
CO2 SERPL-SCNC: 26.8 MMOL/L (ref 21–32)
CREAT SERPL-MCNC: 0.8 MG/DL (ref 0.6–1.3)
EOSINOPHIL # BLD AUTO: 0.3 K/UL (ref 0–0.4)
EOSINOPHIL NFR BLD AUTO: 4.9 % (ref 0–4)
ERYTHROCYTE [DISTWIDTH] IN BLOOD BY AUTOMATED COUNT: 13.3 % (ref 11.6–13.7)
ETHANOL SERPL-MCNC: 88 MG/DL (ref ?–10)
GFR SERPL CREATININE-BSD FRML MDRD: 101 ML/MIN (ref 90–?)
GLUCOSE SERPL-MCNC: 115 MG/DL (ref 74–106)
HCT VFR BLD AUTO: 33.5 % (ref 36–48)
HGB BLD-MCNC: 11.3 G/DL (ref 12–16)
LYMPHOCYTES # BLD AUTO: 1.1 K/UL (ref 2.5–16.5)
LYMPHOCYTES NFR BLD AUTO: 17.8 % (ref 20.5–51.1)
MCH RBC QN AUTO: 32 PG (ref 27–31)
MCHC RBC AUTO-ENTMCNC: 34 G/DL (ref 33–37)
MCV RBC AUTO: 93.6 FL (ref 80–94)
MONOCYTES # BLD AUTO: 0.2 K/UL (ref 0.8–1)
MONOCYTES NFR BLD AUTO: 2.8 % (ref 1.7–9.3)
NEUTROPHILS # BLD AUTO: 4.7 K/UL (ref 1.8–7.7)
NEUTROPHILS NFR BLD AUTO: 74.2 % (ref 42.2–75.2)
PLATELET # BLD AUTO: 368 K/UL (ref 140–450)
POTASSIUM SERPL-SCNC: 3.4 MMOL/L (ref 3.5–5.1)
PROT SERPL-MCNC: 8 G/DL (ref 6.4–8.2)
RBC # BLD AUTO: 3.58 MIL/UL (ref 4.2–5.4)
SALICYLATES SERPL-MCNC: < 2.8 MG/DL (ref 2.8–20)
SODIUM SERPL-SCNC: 135 MMOL/L (ref 136–145)
WBC # BLD AUTO: 6.3 K/UL (ref 4.8–10.8)

## 2024-10-15 PROCEDURE — G0480 DRUG TEST DEF 1-7 CLASSES: HCPCS

## 2024-10-15 PROCEDURE — 80048 BASIC METABOLIC PNL TOTAL CA: CPT

## 2024-10-15 PROCEDURE — 36415 COLL VENOUS BLD VENIPUNCTURE: CPT

## 2024-10-15 PROCEDURE — 85025 COMPLETE CBC W/AUTO DIFF WBC: CPT

## 2024-10-15 PROCEDURE — 81025 URINE PREGNANCY TEST: CPT

## 2024-10-15 PROCEDURE — 99283 EMERGENCY DEPT VISIT LOW MDM: CPT

## 2024-10-15 PROCEDURE — 80076 HEPATIC FUNCTION PANEL: CPT

## 2024-10-15 PROCEDURE — 80305 DRUG TEST PRSMV DIR OPT OBS: CPT

## 2024-10-15 PROCEDURE — G0482 DRUG TEST DEF 15-21 CLASSES: HCPCS

## 2024-10-15 PROCEDURE — 96360 HYDRATION IV INFUSION INIT: CPT

## 2024-10-15 PROCEDURE — 82550 ASSAY OF CK (CPK): CPT

## 2024-10-16 VITALS — OXYGEN SATURATION: 100 %

## 2024-10-16 VITALS — OXYGEN SATURATION: 99 %

## 2024-10-16 VITALS — DIASTOLIC BLOOD PRESSURE: 67 MMHG | SYSTOLIC BLOOD PRESSURE: 110 MMHG | HEART RATE: 92 BPM | RESPIRATION RATE: 15 BRPM

## 2024-10-16 LAB
AMPHET UR-MCNC: NEGATIVE NG/ML
BARBITURATES UR QL SCN: NEGATIVE NG/ML
BENZODIAZ UR QL SCN: NEGATIVE NG/ML
BZE UR QL SCN: NEGATIVE NG/ML
CANNABINOIDS UR QL SCN: NEGATIVE NG/ML
OPIATES UR QL SCN: NEGATIVE NG/ML
PCP UR QL SCN: NEGATIVE NG/ML

## 2024-10-16 RX ADMIN — SODIUM CHLORIDE ONE MLS/HR: 9 INJECTION, SOLUTION INTRAVENOUS at 02:10
